# Patient Record
Sex: MALE | Race: WHITE | NOT HISPANIC OR LATINO | Employment: OTHER | URBAN - METROPOLITAN AREA
[De-identification: names, ages, dates, MRNs, and addresses within clinical notes are randomized per-mention and may not be internally consistent; named-entity substitution may affect disease eponyms.]

---

## 2018-03-08 ENCOUNTER — DOCUMENTATION ONLY (OUTPATIENT)
Dept: NEUROLOGY | Facility: HOSPITAL | Age: 53
End: 2018-03-08

## 2018-03-08 NOTE — PROGRESS NOTES
[facility] Gastroenterology    CC: family history of colon cancer     HPI 52 y.o. male with a history of allergic rhinitis, chronic mild GERD presents for follow up a family history of colon cancer in his father around age 65yrs. He had a screening colonoscopy with Dr. Wan An 5 years ago which was revealing for one small polyp. He has no recent history of rectal bleeding or abdominal pain. He experiences almost daily heartburn which is worse when he eats larger meals or lies flat after eating. His GERD symptoms are improved with zantac and pepcid as well as an OTC digestive enzyme supplement. He has no history of cardiac or pulmonary disease.     Past Medical History:   Personal history of one small colon polyp - removed 5 years ago   Mild chronic GERD - previous EGD negative for Carranza esophagus   History of inguinal hernia repair       Medications:   Flonase    Aspirin 81mg daily (for primary prophylaxis)   Pepcid prn GERD   OTC digestive enzymes     Review of Systems  General ROS: negative for chills, fever or weight loss  Cardiovascular ROS: no chest pain or dyspnea on exertion  Gastrointestinal ROS: no abdominal pain; positive GERD     Physical Examination    General appearance: alert, cooperative, no distress  HENT: Normocephalic, atraumatic, neck symmetrical, no nasal discharge   Lungs: clear to auscultation bilaterally, no dullness to percussion bilaterally  Heart: regular rate and rhythm without rub; no displacement of the PMI   Abdomen: soft, non-tender; bowel sounds normoactive; no organomegaly  Extremities: extremities symmetric; no clubbing, cyanosis, or edema  Neurologic: Alert and oriented X 3, normal strength, normal coordination and gait    Assessment:   History of colon polyp   Family history of colon cancer   Mild chronic GERD     Plan:  Repeat colonoscopy is due at this time for surveillance   Continue aspirin 81mg daily for now   Change pepcid to every day if experiencing daily GERD       Seamus  MD Shawn   [address], [address]   [place] [id number]   [phone]

## 2022-10-20 ENCOUNTER — OFFICE VISIT (OUTPATIENT)
Dept: SLEEP MEDICINE | Facility: CLINIC | Age: 57
End: 2022-10-20
Payer: COMMERCIAL

## 2022-10-20 VITALS
WEIGHT: 167.56 LBS | DIASTOLIC BLOOD PRESSURE: 87 MMHG | HEIGHT: 75 IN | HEART RATE: 83 BPM | SYSTOLIC BLOOD PRESSURE: 130 MMHG | BODY MASS INDEX: 20.83 KG/M2

## 2022-10-20 DIAGNOSIS — F51.09 OTHER INSOMNIA NOT DUE TO A SUBSTANCE OR KNOWN PHYSIOLOGICAL CONDITION: Primary | ICD-10-CM

## 2022-10-20 DIAGNOSIS — R35.1 NOCTURIA: ICD-10-CM

## 2022-10-20 DIAGNOSIS — R06.83 SNORING: ICD-10-CM

## 2022-10-20 DIAGNOSIS — R53.83 FATIGUE, UNSPECIFIED TYPE: ICD-10-CM

## 2022-10-20 PROCEDURE — 1159F PR MEDICATION LIST DOCUMENTED IN MEDICAL RECORD: ICD-10-PCS | Mod: CPTII,S$GLB,, | Performed by: INTERNAL MEDICINE

## 2022-10-20 PROCEDURE — 3079F PR MOST RECENT DIASTOLIC BLOOD PRESSURE 80-89 MM HG: ICD-10-PCS | Mod: CPTII,S$GLB,, | Performed by: INTERNAL MEDICINE

## 2022-10-20 PROCEDURE — 99999 PR PBB SHADOW E&M-EST. PATIENT-LVL III: ICD-10-PCS | Mod: PBBFAC,,, | Performed by: INTERNAL MEDICINE

## 2022-10-20 PROCEDURE — 3075F PR MOST RECENT SYSTOLIC BLOOD PRESS GE 130-139MM HG: ICD-10-PCS | Mod: CPTII,S$GLB,, | Performed by: INTERNAL MEDICINE

## 2022-10-20 PROCEDURE — 1159F MED LIST DOCD IN RCRD: CPT | Mod: CPTII,S$GLB,, | Performed by: INTERNAL MEDICINE

## 2022-10-20 PROCEDURE — 99999 PR PBB SHADOW E&M-EST. PATIENT-LVL III: CPT | Mod: PBBFAC,,, | Performed by: INTERNAL MEDICINE

## 2022-10-20 PROCEDURE — 3075F SYST BP GE 130 - 139MM HG: CPT | Mod: CPTII,S$GLB,, | Performed by: INTERNAL MEDICINE

## 2022-10-20 PROCEDURE — 99214 OFFICE O/P EST MOD 30 MIN: CPT | Mod: S$GLB,,, | Performed by: INTERNAL MEDICINE

## 2022-10-20 PROCEDURE — 99214 PR OFFICE/OUTPT VISIT, EST, LEVL IV, 30-39 MIN: ICD-10-PCS | Mod: S$GLB,,, | Performed by: INTERNAL MEDICINE

## 2022-10-20 PROCEDURE — 3079F DIAST BP 80-89 MM HG: CPT | Mod: CPTII,S$GLB,, | Performed by: INTERNAL MEDICINE

## 2022-10-20 RX ORDER — ESZOPICLONE 2 MG/1
2 TABLET, FILM COATED ORAL NIGHTLY PRN
Qty: 15 TABLET | Refills: 1 | Status: SHIPPED | OUTPATIENT
Start: 2022-10-20 | End: 2023-01-03 | Stop reason: SDUPTHER

## 2022-10-20 RX ORDER — DIPHENHYDRAMINE HCL 25 MG
25 CAPSULE ORAL NIGHTLY PRN
COMMUNITY

## 2022-10-20 RX ORDER — CHOLECALCIFEROL (VITAMIN D3) 25 MCG
5000 TABLET ORAL DAILY
COMMUNITY

## 2022-10-20 RX ORDER — CYANOCOBALAMIN (VITAMIN B-12) 500 MCG
3 TABLET ORAL NIGHTLY
COMMUNITY

## 2022-10-20 NOTE — PROGRESS NOTES
"  Referred by Ronn Reardon MD     NEW PATIENT VISIT    Lobito Macdonald  is a pleasant 57 y.o. male  with no significnt PMH who presents for intermittent insomnia..    SLEEP SCHEDULE   Environment    Bed Time 10:30PM   Sleep Latency    Arousals Waking up 1in the middle of the night, hard to get back to sleep   Nocturia Once per night   Back to sleep Can take a long time if stress in his life   Wake time 5:30 AM   Naps    Work Stressful at times     No past medical history on file.  There is no problem list on file for this patient.      Current Outpatient Medications:     diphenhydrAMINE (BENADRYL) 25 mg capsule, Take 25 mg by mouth nightly as needed for Insomnia., Disp: , Rfl:     melatonin (MELATIN), Take 3 mg by mouth every evening., Disp: , Rfl:     vitamin D (VITAMIN D3) 1000 units Tab, Take 5,000 Units by mouth once daily., Disp: , Rfl:      Vitals:    10/20/22 1557   BP: 130/87   BP Location: Left arm   Patient Position: Sitting   BP Method: Medium (Automatic)   Pulse: 83   Weight: 76 kg (167 lb 8.8 oz)   Height: 6' 3" (1.905 m)     Physical Exam:    GEN:   Well-appearing  Psych:  Appropriate affect, demonstrates insight  SKIN:  No rash on the face or bridge of the nose    LABS:   No results found for: HGB, CO2    RECORDS REVIEWED PREVIOUSLY:    No prior sleep testing.    ASSESSMENT    No flowsheet data found.  PROBLEM DESCRIPTION/ Sx on Presentation  STATUS   screening [place]   denies snoring, rare snoring arousals when napping on weekends, no witnessed apneas   Some     New   Daytime Sx   + sleepiness when inactive if had a bad night sleep the night before  ESS 3/24 on intake  New   Insomnia   Onset:                      Maintenance:         waking up at night  Stimulus control?: Meditation, avoiding clock, usually stays in bed.  Caffeine:  Tea in AM (1/2 caff before lunch)  Alcohol:                Occasional alcohol in the evening (has decreased)  Depression: No change in his mood or " depression  Prior hypnotics:      trazodone (hangoer), hydroxyzine  Current hypnotics: benadryl, melatonin which help    New   Nocturia   x 1 per sleep period  New   Other issues:     PLAN     -discussed CBTi, and he is interested in BEBP program  --we discussed the risks of falls, worsening insomnia, rebound insomnia, and dependence with chronic sleeping medications.  -recommend sleep testing   -will try lunesta 2mg 15tablets q month for now.  -discussed trial therapy if [place] present and the patient is  open to a trial of CPAP therapy    RTC          The patient was given open opportunity to ask questions and/or express concerns about treatment plan.   All questions/concerns were discussed.     Two patient identifiers used prior to evaluation.

## 2022-10-21 ENCOUNTER — PATIENT MESSAGE (OUTPATIENT)
Dept: PSYCHIATRY | Facility: CLINIC | Age: 57
End: 2022-10-21
Payer: COMMERCIAL

## 2022-11-17 ENCOUNTER — TELEPHONE (OUTPATIENT)
Dept: SLEEP MEDICINE | Facility: OTHER | Age: 57
End: 2022-11-17
Payer: COMMERCIAL

## 2022-12-20 ENCOUNTER — HOSPITAL ENCOUNTER (OUTPATIENT)
Dept: SLEEP MEDICINE | Facility: OTHER | Age: 57
Discharge: HOME | End: 2022-12-20
Attending: INTERNAL MEDICINE
Payer: COMMERCIAL

## 2022-12-20 DIAGNOSIS — R06.83 SNORING: ICD-10-CM

## 2022-12-20 DIAGNOSIS — F51.09 OTHER INSOMNIA NOT DUE TO A SUBSTANCE OR KNOWN PHYSIOLOGICAL CONDITION: ICD-10-CM

## 2022-12-20 DIAGNOSIS — R53.83 FATIGUE, UNSPECIFIED TYPE: ICD-10-CM

## 2022-12-20 DIAGNOSIS — R35.1 NOCTURIA: ICD-10-CM

## 2022-12-20 PROCEDURE — 95800 SLP STDY UNATTENDED: CPT

## 2022-12-21 PROBLEM — F51.09 OTHER INSOMNIA NOT DUE TO A SUBSTANCE OR KNOWN PHYSIOLOGICAL CONDITION: Status: ACTIVE | Noted: 2022-12-21

## 2022-12-23 PROCEDURE — 95806 SLEEP STUDY UNATT&RESP EFFT: CPT | Mod: 26,,, | Performed by: INTERNAL MEDICINE

## 2022-12-23 PROCEDURE — 95806 PR SLEEP STUDY, UNATTENDED, SIMUL RECORD HR/O2 SAT/RESP FLOW/RESP EFFT: ICD-10-PCS | Mod: 26,,, | Performed by: INTERNAL MEDICINE

## 2023-01-03 ENCOUNTER — PATIENT MESSAGE (OUTPATIENT)
Dept: SLEEP MEDICINE | Facility: CLINIC | Age: 58
End: 2023-01-03
Payer: COMMERCIAL

## 2023-01-04 DIAGNOSIS — F51.09 OTHER INSOMNIA NOT DUE TO A SUBSTANCE OR KNOWN PHYSIOLOGICAL CONDITION: Primary | ICD-10-CM

## 2023-01-04 RX ORDER — ESZOPICLONE 2 MG/1
2 TABLET, FILM COATED ORAL NIGHTLY PRN
Qty: 15 TABLET | Refills: 5 | Status: SHIPPED | OUTPATIENT
Start: 2023-01-04 | End: 2023-04-28 | Stop reason: SDUPTHER

## 2023-04-27 ENCOUNTER — PATIENT MESSAGE (OUTPATIENT)
Dept: SLEEP MEDICINE | Facility: CLINIC | Age: 58
End: 2023-04-27
Payer: COMMERCIAL

## 2023-04-28 RX ORDER — ESZOPICLONE 2 MG/1
2 TABLET, FILM COATED ORAL NIGHTLY PRN
Qty: 15 TABLET | Refills: 5 | Status: SHIPPED | OUTPATIENT
Start: 2023-04-28 | End: 2023-09-18 | Stop reason: SDUPTHER

## 2023-07-17 ENCOUNTER — PATIENT MESSAGE (OUTPATIENT)
Dept: SLEEP MEDICINE | Facility: CLINIC | Age: 58
End: 2023-07-17
Payer: COMMERCIAL

## 2023-07-17 DIAGNOSIS — G47.33 OSA (OBSTRUCTIVE SLEEP APNEA): Primary | ICD-10-CM

## 2023-08-07 DIAGNOSIS — G47.33 OSA (OBSTRUCTIVE SLEEP APNEA): Primary | ICD-10-CM

## 2023-09-18 ENCOUNTER — OFFICE VISIT (OUTPATIENT)
Dept: SLEEP MEDICINE | Facility: CLINIC | Age: 58
End: 2023-09-18
Payer: COMMERCIAL

## 2023-09-18 VITALS
HEART RATE: 80 BPM | WEIGHT: 165.56 LBS | DIASTOLIC BLOOD PRESSURE: 84 MMHG | HEIGHT: 75 IN | BODY MASS INDEX: 20.59 KG/M2 | SYSTOLIC BLOOD PRESSURE: 120 MMHG

## 2023-09-18 DIAGNOSIS — F51.09 OTHER INSOMNIA NOT DUE TO A SUBSTANCE OR KNOWN PHYSIOLOGICAL CONDITION: Primary | ICD-10-CM

## 2023-09-18 DIAGNOSIS — G47.33 OSA (OBSTRUCTIVE SLEEP APNEA): ICD-10-CM

## 2023-09-18 PROCEDURE — 3074F SYST BP LT 130 MM HG: CPT | Mod: CPTII,S$GLB,, | Performed by: INTERNAL MEDICINE

## 2023-09-18 PROCEDURE — 1159F MED LIST DOCD IN RCRD: CPT | Mod: CPTII,S$GLB,, | Performed by: INTERNAL MEDICINE

## 2023-09-18 PROCEDURE — 1159F PR MEDICATION LIST DOCUMENTED IN MEDICAL RECORD: ICD-10-PCS | Mod: CPTII,S$GLB,, | Performed by: INTERNAL MEDICINE

## 2023-09-18 PROCEDURE — 3079F DIAST BP 80-89 MM HG: CPT | Mod: CPTII,S$GLB,, | Performed by: INTERNAL MEDICINE

## 2023-09-18 PROCEDURE — 99999 PR PBB SHADOW E&M-EST. PATIENT-LVL III: CPT | Mod: PBBFAC,,, | Performed by: INTERNAL MEDICINE

## 2023-09-18 PROCEDURE — 3008F PR BODY MASS INDEX (BMI) DOCUMENTED: ICD-10-PCS | Mod: CPTII,S$GLB,, | Performed by: INTERNAL MEDICINE

## 2023-09-18 PROCEDURE — 99214 PR OFFICE/OUTPT VISIT, EST, LEVL IV, 30-39 MIN: ICD-10-PCS | Mod: S$GLB,,, | Performed by: INTERNAL MEDICINE

## 2023-09-18 PROCEDURE — 99214 OFFICE O/P EST MOD 30 MIN: CPT | Mod: S$GLB,,, | Performed by: INTERNAL MEDICINE

## 2023-09-18 PROCEDURE — 3079F PR MOST RECENT DIASTOLIC BLOOD PRESSURE 80-89 MM HG: ICD-10-PCS | Mod: CPTII,S$GLB,, | Performed by: INTERNAL MEDICINE

## 2023-09-18 PROCEDURE — 99999 PR PBB SHADOW E&M-EST. PATIENT-LVL III: ICD-10-PCS | Mod: PBBFAC,,, | Performed by: INTERNAL MEDICINE

## 2023-09-18 PROCEDURE — 3008F BODY MASS INDEX DOCD: CPT | Mod: CPTII,S$GLB,, | Performed by: INTERNAL MEDICINE

## 2023-09-18 PROCEDURE — 3074F PR MOST RECENT SYSTOLIC BLOOD PRESSURE < 130 MM HG: ICD-10-PCS | Mod: CPTII,S$GLB,, | Performed by: INTERNAL MEDICINE

## 2023-09-18 RX ORDER — ESZOPICLONE 2 MG/1
2 TABLET, FILM COATED ORAL NIGHTLY PRN
Qty: 20 TABLET | Refills: 5 | Status: SHIPPED | OUTPATIENT
Start: 2023-09-18 | End: 2024-01-04 | Stop reason: SDUPTHER

## 2023-09-18 NOTE — PROGRESS NOTES
"  ESTABLISHED PATIENT VISIT    Lobito Macdonald  is a pleasant 58 y.o. male  with no significnt PMH who presents for intermittent insomnia..    Here today for: CPAP follow-up     PLAN last visit:   -discussed CBTi, and he is interested in BEBP program  --we discussed the risks of falls, worsening insomnia, rebound insomnia, and dependence with chronic sleeping medications.  -recommend sleep testing   -will try lunesta 2mg 15tablets q month for now.  -discussed trial therapy if [place] present and the patient is  open to a trial of CPAP therapy      Since last visit:     HST 12.20.22: AHI 3 (3% =5), RDI 7, intermittent apneas, real hypops  Aug 2023-> aerophagia, can't "sleep at all with it" x 4 days  8.7.23: 4/6 days x 2h 14min, 6-12 (7.2/9.7/10.2) leak 0/12/90, AHI 2.2  set CPAP =4cwp  9.4.23: 26/27 x 3h 23,min, CPAP =4, leak 0/5/67, AHI 1.2    Had aerophagia even at 4 cwp        PAP history   Problems Had aerophagia   Mask    Pressure    DME HME   Machine age    Download          SLEEP SCHEDULE   Environment    Bed Time 10:30PM   Sleep Latency    Arousals Waking up 1in the middle of the night, hard to get back to sleep   Nocturia Once per night   Back to sleep Can take a long time if stress in his life   Wake time 5:30 AM   Naps    Work Stressful at times     No past medical history on file.  Patient Active Problem List   Diagnosis    Other insomnia not due to a substance or known physiological condition       Current Outpatient Medications:     diphenhydrAMINE (BENADRYL) 25 mg capsule, Take 25 mg by mouth nightly as needed for Insomnia., Disp: , Rfl:     eszopiclone (LUNESTA) 2 MG Tab, Take 1 tablet (2 mg total) by mouth nightly as needed (insomnia). To avoid risk of rebound insomnia, try to take 3 times a week or less, Disp: 15 tablet, Rfl: 5    melatonin (MELATIN), Take 3 mg by mouth every evening., Disp: , Rfl:     vitamin D (VITAMIN D3) 1000 units Tab, Take 5,000 Units by mouth once daily., Disp: , Rfl:  " "    Vitals:    09/18/23 1431   BP: 120/84   BP Location: Left arm   Patient Position: Sitting   Pulse: 80   Weight: 75.1 kg (165 lb 9.1 oz)   Height: 6' 3" (1.905 m)       Physical Exam:    GEN:   Well-appearing  Psych:  Appropriate affect, demonstrates insight  SKIN:  No rash on the face or bridge of the nose    LABS:   No results found for: "HGB", "CO2"    RECORDS REVIEWED PREVIOUSLY:        ASSESSMENT    No flowsheet data found.  PROBLEM DESCRIPTION/ Sx on Presentation Interval HX STATUS   Mild [place]   denies snoring, rare snoring arousals when napping on weekends, no witnessed apneas     Had aerophagia with CPAP, no sx response   Trouble with CPAP uncontrolled   Daytime Sx   + sleepiness when inactive if had a bad night sleep the night before  ESS 3/24 on intake N/a    Insomnia   Onset:                      Maintenance:         waking up at night  Stimulus control?: Meditation, avoiding clock, usually stays in bed.  Caffeine:  Minimal in AM  Alcohol:                Occasional alcohol in the evening (has decreased- abstinence helped a little)  Depression: No change in his mood or depression  Prior hypnotics:      trazodone (hangoer), hydroxyzine, valium  Current hypnotics: benadryl, melatonin (lunesta 2mg instead when working - occasional rebound insomnia)   Sleeping better since he does not have work     Has not needed lunesta since he stopped working a few months ago    Concerned about how he will sleep when working again stable   Other issues: nocturia x 1    PLAN     -encouraged continued side sleeping  -not benefitting from CPAP, ok to return  -will hold off on pursuing alternatives (MAD etc)  -discussed trial of beslsomra 10-20mg (had rebound insomnia with lunesta, hangoverwith trazodone)  -discussed CBTi and trial of discontinuing hypnotics at that time    RTC in January          The patient was given open opportunity to ask questions and/or express concerns about treatment plan.   All questions/concerns " were discussed.     Two patient identifiers used prior to evaluation.

## 2024-01-04 ENCOUNTER — OFFICE VISIT (OUTPATIENT)
Dept: SLEEP MEDICINE | Facility: CLINIC | Age: 59
End: 2024-01-04
Payer: COMMERCIAL

## 2024-01-04 VITALS
HEIGHT: 75 IN | WEIGHT: 165 LBS | HEART RATE: 67 BPM | DIASTOLIC BLOOD PRESSURE: 79 MMHG | SYSTOLIC BLOOD PRESSURE: 126 MMHG | BODY MASS INDEX: 20.51 KG/M2

## 2024-01-04 DIAGNOSIS — F51.09 OTHER INSOMNIA NOT DUE TO A SUBSTANCE OR KNOWN PHYSIOLOGICAL CONDITION: Primary | ICD-10-CM

## 2024-01-04 PROCEDURE — 99214 OFFICE O/P EST MOD 30 MIN: CPT | Mod: S$GLB,,, | Performed by: INTERNAL MEDICINE

## 2024-01-04 PROCEDURE — 99999 PR PBB SHADOW E&M-EST. PATIENT-LVL III: CPT | Mod: PBBFAC,,, | Performed by: INTERNAL MEDICINE

## 2024-01-04 PROCEDURE — 3078F DIAST BP <80 MM HG: CPT | Mod: CPTII,S$GLB,, | Performed by: INTERNAL MEDICINE

## 2024-01-04 PROCEDURE — 1159F MED LIST DOCD IN RCRD: CPT | Mod: CPTII,S$GLB,, | Performed by: INTERNAL MEDICINE

## 2024-01-04 PROCEDURE — 3074F SYST BP LT 130 MM HG: CPT | Mod: CPTII,S$GLB,, | Performed by: INTERNAL MEDICINE

## 2024-01-04 PROCEDURE — 3008F BODY MASS INDEX DOCD: CPT | Mod: CPTII,S$GLB,, | Performed by: INTERNAL MEDICINE

## 2024-01-04 RX ORDER — ESZOPICLONE 2 MG/1
2 TABLET, FILM COATED ORAL NIGHTLY PRN
Qty: 60 TABLET | Refills: 1 | Status: SHIPPED | OUTPATIENT
Start: 2024-01-04

## 2024-01-04 NOTE — PROGRESS NOTES
"  ESTABLISHED PATIENT VISIT    Lobito Macdonald  is a pleasant 58 y.o. male  with no significnt PMH who presents for intermittent insomnia..    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       SLEEP SCHEDULE   Environment Less trouble when not needing to work   Prior hypnotics trazodone (hangoer)  hydroxyzine  valium  Belsomra (not covered by insurance)   Current hypnotics melatonin   lunesta 2mg prn (stays asleep better)  Benadryl (grogginess, if not taking lunesta)   Bed Time 10:30PM   Sleep Latency    Arousals Occasional (with lunesta)   Stimulus control Stays in bed (meditates, avoids clockwatching)   Nocturia    Back to sleep Can take up to 90 minutes   Wake time 5:30AM   Caffeine Minimal   Exercise    Naps    Work [employer] -[employer]       No past medical history on file.  Patient Active Problem List   Diagnosis    Other insomnia not due to a substance or known physiological condition       Current Outpatient Medications:     diphenhydrAMINE (BENADRYL) 25 mg capsule, Take 25 mg by mouth nightly as needed for Insomnia., Disp: , Rfl:     eszopiclone (LUNESTA) 2 MG Tab, Take 1 tablet (2 mg total) by mouth nightly as needed (insomnia). To avoid risk of rebound insomnia, try to take 5 times a week or less, Disp: 20 tablet, Rfl: 5    melatonin (MELATIN), Take 3 mg by mouth every evening., Disp: , Rfl:     suvorexant (BELSOMRA) 20 mg Tab, Take 10-20 mg by mouth nightly as needed (trouble sleeping)., Disp: 30 tablet, Rfl: 5    vitamin D (VITAMIN D3) 1000 units Tab, Take 5,000 Units by mouth once daily., Disp: , Rfl:      Vitals:    01/04/24 1329   BP: 126/79   BP Location: Left arm   Patient Position: Sitting   BP Method: Small (Automatic)   Pulse: 67   Weight: 74.8 kg (165 lb)   Height: 6' 3" (1.905 m)         Physical Exam:    GEN:   Well-appearing  Psych:  Appropriate affect, demonstrates insight  SKIN:  No rash on the face or " "bridge of the nose    LABS:   No results found for: "HGB", "CO2"    RECORDS REVIEWED PREVIOUSLY:    HST 12.20.22: AHI 3 (3% =5), RDI 7, intermittent apneas, real hypops  Aug 2023-> aerophagia, can't "sleep at all with it" x 4 days    8.7.23: 4/6 days x 2h 14min, 6-12 (7.2/9.7/10.2) leak 0/12/90, AHI 2.2  set CPAP =4cwp  9.4.23: 26/27 x 3h 23,min, CPAP =4, leak 0/5/67, AHI 1.2    ASSESSMENT    No flowsheet data found.  PROBLEM DESCRIPTION/ Sx on Presentation Interval HX STATUS   Insomnia   Much improved when he doesn't have to work    CBTi: considering pursuing it    Prior hypnotics:      trazodone (hangoer), hydroxyzine, valium    Current hypnotics:   benadryl,   melatonin   (lunesta 2mg instead when working - occasional rebound insomnia)   Sleeping pretty well with lunesta 2mg stable   Other issues: nocturia x 1, Mild [place] (had aerophagia with CPAP, no sx response)    PLAN     -ok to continue lunesta 2mg as needed for sleep (encouraged)  -we discussed the risks of falls, worsening insomnia, rebound insomnia, and dependence with chronic sleeping medications.   -consider CBTi and trial of discontinuing hypnotics at that time      RTC 1 year *         The patient was given open opportunity to ask questions and/or express concerns about treatment plan.   All questions/concerns were discussed.     Two patient identifiers used prior to evaluation.           "

## 2024-02-02 ENCOUNTER — PATIENT MESSAGE (OUTPATIENT)
Dept: PSYCHIATRY | Facility: CLINIC | Age: 59
End: 2024-02-02
Payer: COMMERCIAL

## 2024-02-06 ENCOUNTER — PATIENT MESSAGE (OUTPATIENT)
Dept: PSYCHIATRY | Facility: CLINIC | Age: 59
End: 2024-02-06

## 2024-02-06 ENCOUNTER — OFFICE VISIT (OUTPATIENT)
Dept: PSYCHIATRY | Facility: CLINIC | Age: 59
End: 2024-02-06
Payer: COMMERCIAL

## 2024-02-06 DIAGNOSIS — F51.09 OTHER INSOMNIA NOT DUE TO A SUBSTANCE OR KNOWN PHYSIOLOGICAL CONDITION: Primary | ICD-10-CM

## 2024-02-06 PROCEDURE — 90791 PSYCH DIAGNOSTIC EVALUATION: CPT | Mod: 95,,, | Performed by: PSYCHOLOGIST

## 2024-02-06 NOTE — PROGRESS NOTES
"[facility] Psychiatry Initial Visit (PhD/LCSW)      Patient Name:  Lobito Macdonald  Date:   02/06/2024  Site:  The patient location is: home, [place]  The chief complaint leading to consultation is: insomnia    Visit type: audiovisual    Face to Face time with patient: 40  50 minutes of total time spent on the encounter, which includes face to face time and non-face to face time preparing to see the patient (eg, review of tests), Obtaining and/or reviewing separately obtained history, Documenting clinical information in the electronic or other health record, Independently interpreting results (not separately reported) and communicating results to the patient/family/caregiver, or Care coordination (not separately reported).     Each patient to whom he or she provides medical services by telemedicine is:  (1) informed of the relationship between the physician and patient and the respective role of any other health care provider with respect to management of the patient; and (2) notified that he or she may decline to receive medical services by telemedicine and may withdraw from such care at any time.    Notes:   Referral source:  Triston Hinson MD       Chief complaint/reason for encounter:  Psychological Evaluation to assess suitability for admission to the [facility]   Clinical status of patient:  Outpatient   Met with:  Patient   CPT Code: 87944      Before this evaluation was initiated, the purposes and process of the assessment and the limits of confidentiality were discussed with the patient who expressed understanding of these issues and verbally consented to proceed with the evaluation.      History of present illness:  Mr. Lobito Macdonald is a 58-year-old male who is pursuing psychotherapy to improve insomnia.  Patient states, "This has been going on for a long time. I'm not working at the moment but when I am, it's really bad. I work at a job where there's always a lot of stress and people changing " "their minds late at night. It's so constant that I can't sleep. 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 When I'm not working, I don't have the sleep difficulties. I'm usually in bed for 10, try to fall asleep around 10:30. I usually don't have much trouble falling asleep but I wake up a few hours in and then I can't fall back asleep for hours. I don't have that "groggy" feeling that will put me back to sleep. Sometimes I wake up at 2 or 3 am and never return to sleep. The times I have to be at work varies, so my wake time varies pretty drastically as well, anywhere from 4 am to 7 am. Sometimes I go into the office and try to take a nap for 15-20 minutes."     Patient stressed that his sleep difficulties are directly related to his work schedule and states when he is not working, he sleeps "great." He endorsed significant stress and anxiety related to his job.     Pain scale:  0/10      Medical history:    Patient Active Problem List   Diagnosis    Other insomnia not due to a substance or known physiological condition       Psychiatric symptoms:   Depression - Denied depressed mood, loss of interest in pleasurable activities, anhedonia, sleep changes, decreased motivation, decreased concentration, feelings of excessive or irrational guilt, helplessness, hopelessness, increased or decreased appetite, weight changes, increased or decreased motor activity, decreased energy, suicidal ideations/thoughts of death.  Yarelis/Hypomania - Denied increased goal directed activity, decreased need for sleep, pressured speech or increased talkative, racing thoughts, increased risk-taking behavior, episodic elevated or irritable mood, flight of ideas, distractibility, inflated self-esteem, grandiosity  Anxiety - Endorsed excessive worry, difficulty controlling worrying, difficulty concentrating, sleep disturbance, racing thoughts, being unable to relax  Panic Attacks- " Denied palpitations, sweating, trembling, dyspnea, choking sensation, chest pain/discomfort, nausea, dizziness, chills or hot flashes, tingling, derealization, fear of losing control, fear of dying.  Thoughts - Denied any AVH, paranoia, delusions, ideas of reference, thought insertion or thought broadcasting  Suicidal thoughts/behaviors - denied passive or active SI, denied suicidal plans or intent  Self-injury - denied.  Substance abuse - denied abuse or dependence.   Sleep - Endorsed, see above     Current psychosocial stressors:  work can be very stressful  Report of coping skills:  drink a beer, distract however I can to try to tune the stress out, watch something on [employer]  Support system:  friends      Current and past substance use:   Alcohol: 3-5 a week.  Denied history of abuse or dependency.   Drugs:  Denied current use.  Denied history of abuse or dependency.   Tobacco:  Denied current use.   Caffeine:  cut back significantly recently. Tea in morning and one coffee around 10 or 11 am     Current Psychiatric Treatment:   Medications:  Lunesta, Melatonin  Psychotherapy:  denied     Psychiatric history:   Previous diagnosis:  denied  Previous hospitalizations:  denied  History of outpatient treatment:  individual therapy (CBT-based)  Previous suicide attempt:  Denied.   Family history of psychiatric illness:  denied     Trauma history:  Denied.      Social history:  Mr. Macdonald was born and raised in [place], [place].  He described his childhood as average.  He denied childhood trauma, abuse, and neglect.  He has a bachelor's degree in art history.  He is currently an .  He denied  service.  He is not on disability.  He is single and has no children.  He currently lives alone.     Legal history:  He denied history of arrests and convictions.  He is not currently involved in civil or criminal litigation.      Mental Status Exam:   General appearance:  Appears stated age, neatly dressed,  well groomed   Speech:  Normal rate, normal tone, normal pitch, normal volume   Level of cooperation:  Cooperative   Thought processes:  Logical, goal-directed   Mood:  Euthymic   Thought content:  No illusions, no visual hallucinations, no auditory hallucinations, no delusions, no active or passive homicidal thoughts, no active or passive suicidal ideation, no obsessions, no compulsions, no violence   Affect:  Appropriate   Orientation:  Oriented to person, place, and date   Memory:   Recent memory:  Intact  Remote memory: Intact   Attention span and concentration:  Appropriate  Fund of general knowledge: Appropriate  Judgment and insight: Fair   Language:  Intact      Diagnostic impression:     ICD-10-CM ICD-9-CM   1. Other insomnia not due to a substance or known physiological condition  F51.09 327.09         Plan:  Mr. Lobito Macdonald will not be admitted to the [facility] due to feeling the program is not intended for stress management and stating he is already doing most of what the program would recommend regarding his sleep routine/habits.  He was provided with resources for the Stress Management psychoeducational group and will be referred back to his referring provider.

## 2024-02-22 DIAGNOSIS — Z12.11 SCREENING FOR MALIGNANT NEOPLASM OF COLON: Primary | ICD-10-CM

## 2024-02-22 RX ORDER — SODIUM, POTASSIUM,MAG SULFATES 17.5-3.13G
1 SOLUTION, RECONSTITUTED, ORAL ORAL DAILY
Qty: 1 KIT | Refills: 0 | Status: SHIPPED | OUTPATIENT
Start: 2024-02-22 | End: 2024-02-24

## 2024-02-22 RX ORDER — POLYETHYLENE GLYCOL 3350, SODIUM SULFATE, SODIUM CHLORIDE, POTASSIUM CHLORIDE, SODIUM ASCORBATE, AND ASCORBIC ACID 7.5-2.691G
2000 KIT ORAL ONCE
Qty: 1 EACH | Refills: 0 | Status: SHIPPED | OUTPATIENT
Start: 2024-02-22 | End: 2024-02-22

## 2024-02-22 NOTE — PROGRESS NOTES
[facility] Gastroenterology      [facility] 58 y.o. male with a history of advanced colon polyps on previous colonoscopy 5 years ago presents for routine surveillance. He denies rectal bleeding, abdominal pain or change in bowel habits.     Past Medical History  GERD   Arthritis     Medications:   Vitamin D   Melatonin       Physical Examination  General appearance: alert, cooperative, no distress  Lungs: clear to auscultation bilaterally, no dullness to percussion bilaterally  Heart: regular rate and rhythm without rub; no displacement of the PMI   Abdomen: soft, non-tender; bowel sounds normoactive; no organomegaly    Assessment:   58 y.o. male with a history of advanced colon polyps on previous colonoscopy 5 years ago presents for routine surveillance. He denies rectal bleeding, abdominal pain or change in bowel habits.     Plan:  Colonoscopy on Tuesday, Feb 27th at 1pm   Moviprep sent to pharmacy       Seamus Escobar MD   [address] [place] [address], [address]  [place] [id number] [phone]

## 2024-07-22 RX ORDER — ESZOPICLONE 2 MG/1
2 TABLET, FILM COATED ORAL NIGHTLY PRN
Qty: 30 TABLET | Refills: 0 | Status: SHIPPED | OUTPATIENT
Start: 2024-07-22

## 2024-07-22 RX ORDER — ESZOPICLONE 2 MG/1
2 TABLET, FILM COATED ORAL NIGHTLY PRN
Qty: 30 TABLET | Refills: 0 | Status: CANCELLED | OUTPATIENT
Start: 2024-07-22

## 2024-07-22 NOTE — TELEPHONE ENCOUNTER
Requested Prescriptions     Pending Prescriptions Disp Refills    eszopiclone (LUNESTA) 2 MG Tab 30 tablet 0     Sig: Take 1 tablet (2 mg total) by mouth nightly as needed (insomnia). To avoid risk of rebound insomnia, try to take 5 times a week or less    Lov 01/04/24

## 2024-10-09 ENCOUNTER — OFFICE VISIT (OUTPATIENT)
Dept: SLEEP MEDICINE | Facility: CLINIC | Age: 59
End: 2024-10-09
Payer: COMMERCIAL

## 2024-10-09 VITALS
SYSTOLIC BLOOD PRESSURE: 118 MMHG | WEIGHT: 163.13 LBS | DIASTOLIC BLOOD PRESSURE: 80 MMHG | BODY MASS INDEX: 20.28 KG/M2 | HEIGHT: 75 IN | HEART RATE: 76 BPM

## 2024-10-09 DIAGNOSIS — F51.09 OTHER INSOMNIA NOT DUE TO A SUBSTANCE OR KNOWN PHYSIOLOGICAL CONDITION: Primary | ICD-10-CM

## 2024-10-09 PROCEDURE — 3074F SYST BP LT 130 MM HG: CPT | Mod: CPTII,S$GLB,, | Performed by: INTERNAL MEDICINE

## 2024-10-09 PROCEDURE — 1159F MED LIST DOCD IN RCRD: CPT | Mod: CPTII,S$GLB,, | Performed by: INTERNAL MEDICINE

## 2024-10-09 PROCEDURE — 99999 PR PBB SHADOW E&M-EST. PATIENT-LVL III: CPT | Mod: PBBFAC,,, | Performed by: INTERNAL MEDICINE

## 2024-10-09 PROCEDURE — 3079F DIAST BP 80-89 MM HG: CPT | Mod: CPTII,S$GLB,, | Performed by: INTERNAL MEDICINE

## 2024-10-09 PROCEDURE — 3008F BODY MASS INDEX DOCD: CPT | Mod: CPTII,S$GLB,, | Performed by: INTERNAL MEDICINE

## 2024-10-09 PROCEDURE — 99213 OFFICE O/P EST LOW 20 MIN: CPT | Mod: S$GLB,,, | Performed by: INTERNAL MEDICINE

## 2024-10-09 RX ORDER — ESZOPICLONE 2 MG/1
2 TABLET, FILM COATED ORAL NIGHTLY PRN
Qty: 90 TABLET | Refills: 1 | Status: SHIPPED | OUTPATIENT
Start: 2024-10-09

## 2024-10-09 NOTE — PROGRESS NOTES
"  ESTABLISHED PATIENT VISIT    Lobito Macdonald  is a pleasant 59 y.o. male established with the [facility] sleep medicine clinic    Here today for:  follow-up     Since last visit:   See interval history in table below    No past medical history on file.  Patient Active Problem List   Diagnosis    Other insomnia not due to a substance or known physiological condition       Current Outpatient Medications:     diphenhydrAMINE (BENADRYL) 25 mg capsule, Take 25 mg by mouth nightly as needed for Insomnia., Disp: , Rfl:     eszopiclone (LUNESTA) 2 MG Tab, TAKE 1 TABLET (2 MG TOTAL) BY MOUTH NIGHTLY AS NEEDED (INSOMNIA). TO AVOID RISK OF REBOUND INSOMNIA, TRY TO TAKE 5 TIMES A WEEK OR LESS, Disp: 30 tablet, Rfl: 0    melatonin (MELATIN), Take 3 mg by mouth every evening., Disp: , Rfl:     vitamin D (VITAMIN D3) 1000 units Tab, Take 5,000 Units by mouth once daily., Disp: , Rfl:      Vitals:    10/09/24 1501   BP: 118/80   Pulse: 76   Weight: 74 kg (163 lb 2.3 oz)   Height: 6' 3" (1.905 m)       Physical Exam:    GEN:   Well-appearing  Psych:  Appropriate affect, demonstrates insight  SKIN:  No rash on the face or bridge of the nose    LABS:   No results found for: "HGB", "CO2"    RECORDS REVIEWED PREVIOUSLY:    HST 12.20.22: AHI 3 (3% =5), RDI 7, intermittent apneas, real hypops  Aug 2023-> aerophagia, can't "sleep at all with it" x 4 days    8.7.23: 4/6 days x 2h 14min, 6-12 (7.2/9.7/10.2) leak 0/12/90, AHI 2.2  set CPAP =4cwp    9.4.23: 26/27 x 3h 23,min, CPAP =4, leak 0/5/67, AHI 1.2    ASSESSMENT      PROBLEM DESCRIPTION/ Sx on Presentation Interval HX STATUS PLAN   Insomnia     SLEEP SCHEDULE   Duration    Wind- down    Envmnt Less trouble when not needing to work   CBTi Offered  Had intake with Dr. Velásquez (one session -- per pt full course was not recommended)   Meds prior  trazodone (hangoer), hydroxyzine, valium    trazodone (hangoer)  hydroxyzine  Valium  Benadryl (stopped)  Belsomra (not covered by insurance) "   Meds now   melatonin   lunesta 2mg prn 3-4 times per night     Bed Time 10:30PM   Lights out    Latency    Arousals Occasional (with lunesta)   Back to sleep Can take up to 90 minutes   Stim. 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